# Patient Record
Sex: FEMALE | Race: WHITE
[De-identification: names, ages, dates, MRNs, and addresses within clinical notes are randomized per-mention and may not be internally consistent; named-entity substitution may affect disease eponyms.]

---

## 2020-02-09 ENCOUNTER — HOSPITAL ENCOUNTER (EMERGENCY)
Dept: HOSPITAL 75 - ER FS | Age: 30
Discharge: HOME | End: 2020-02-09
Payer: COMMERCIAL

## 2020-02-09 VITALS — WEIGHT: 253.97 LBS | BODY MASS INDEX: 45 KG/M2 | HEIGHT: 62.99 IN

## 2020-02-09 VITALS — SYSTOLIC BLOOD PRESSURE: 132 MMHG | DIASTOLIC BLOOD PRESSURE: 91 MMHG

## 2020-02-09 DIAGNOSIS — R10.84: ICD-10-CM

## 2020-02-09 DIAGNOSIS — Z88.5: ICD-10-CM

## 2020-02-09 DIAGNOSIS — N83.202: Primary | ICD-10-CM

## 2020-02-09 DIAGNOSIS — F17.200: ICD-10-CM

## 2020-02-09 DIAGNOSIS — Z88.8: ICD-10-CM

## 2020-02-09 LAB
ALBUMIN SERPL-MCNC: 4.3 GM/DL (ref 3.2–4.5)
ALP SERPL-CCNC: 70 U/L (ref 40–136)
ALT SERPL-CCNC: 25 U/L (ref 0–55)
APTT PPP: YELLOW S
BACTERIA #/AREA URNS HPF: (no result) /HPF
BASOPHILS # BLD AUTO: 0.1 10^3/UL (ref 0–0.1)
BASOPHILS NFR BLD AUTO: 1 % (ref 0–10)
BILIRUB SERPL-MCNC: < 0.2 MG/DL (ref 0.1–1)
BILIRUB UR QL STRIP: NEGATIVE
BUN/CREAT SERPL: 21
CALCIUM SERPL-MCNC: 9.4 MG/DL (ref 8.5–10.1)
CHLORIDE SERPL-SCNC: 101 MMOL/L (ref 98–107)
CO2 SERPL-SCNC: 25 MMOL/L (ref 21–32)
CREAT SERPL-MCNC: 0.66 MG/DL (ref 0.6–1.3)
EOSINOPHIL # BLD AUTO: 0.3 10^3/UL (ref 0–0.3)
EOSINOPHIL NFR BLD AUTO: 3 % (ref 0–10)
ERYTHROCYTE [DISTWIDTH] IN BLOOD BY AUTOMATED COUNT: 15.3 % (ref 10–14.5)
FIBRINOGEN PPP-MCNC: (no result) MG/DL
GFR SERPLBLD BASED ON 1.73 SQ M-ARVRAT: > 60 ML/MIN
GLUCOSE SERPL-MCNC: 102 MG/DL (ref 70–105)
GLUCOSE UR STRIP-MCNC: NEGATIVE MG/DL
HCT VFR BLD CALC: 40 % (ref 35–52)
HGB BLD-MCNC: 13.1 G/DL (ref 11.5–16)
KETONES UR QL STRIP: NEGATIVE
LEUKOCYTE ESTERASE UR QL STRIP: NEGATIVE
LIPASE SERPL-CCNC: 31 U/L (ref 8–78)
LYMPHOCYTES # BLD AUTO: 4.1 X 10^3 (ref 1–4)
LYMPHOCYTES NFR BLD AUTO: 42 % (ref 12–44)
MANUAL DIFFERENTIAL PERFORMED BLD QL: NO
MCH RBC QN AUTO: 25 PG (ref 25–34)
MCHC RBC AUTO-ENTMCNC: 33 G/DL (ref 32–36)
MCV RBC AUTO: 77 FL (ref 80–99)
MONOCYTES # BLD AUTO: 0.7 X 10^3 (ref 0–1)
MONOCYTES NFR BLD AUTO: 7 % (ref 0–12)
NEUTROPHILS # BLD AUTO: 4.6 X 10^3 (ref 1.8–7.8)
NEUTROPHILS NFR BLD AUTO: 47 % (ref 42–75)
NITRITE UR QL STRIP: NEGATIVE
PH UR STRIP: 5.5 [PH] (ref 5–9)
PLATELET # BLD: 328 10^3/UL (ref 130–400)
PMV BLD AUTO: 9.1 FL (ref 7.4–10.4)
POTASSIUM SERPL-SCNC: 4.4 MMOL/L (ref 3.6–5)
PROT SERPL-MCNC: 7.2 GM/DL (ref 6.4–8.2)
PROT UR QL STRIP: NEGATIVE
SODIUM SERPL-SCNC: 139 MMOL/L (ref 135–145)
SP GR UR STRIP: 1.02 (ref 1.02–1.02)
SQUAMOUS #/AREA URNS HPF: (no result) /HPF
WBC # BLD AUTO: 9.7 10^3/UL (ref 4.3–11)

## 2020-02-09 PROCEDURE — 81000 URINALYSIS NONAUTO W/SCOPE: CPT

## 2020-02-09 PROCEDURE — 85025 COMPLETE CBC W/AUTO DIFF WBC: CPT

## 2020-02-09 PROCEDURE — 96372 THER/PROPH/DIAG INJ SC/IM: CPT

## 2020-02-09 PROCEDURE — 83690 ASSAY OF LIPASE: CPT

## 2020-02-09 PROCEDURE — 80053 COMPREHEN METABOLIC PANEL: CPT

## 2020-02-09 PROCEDURE — 74176 CT ABD & PELVIS W/O CONTRAST: CPT

## 2020-02-09 PROCEDURE — 87088 URINE BACTERIA CULTURE: CPT

## 2020-02-09 PROCEDURE — 36415 COLL VENOUS BLD VENIPUNCTURE: CPT

## 2020-02-09 PROCEDURE — 84703 CHORIONIC GONADOTROPIN ASSAY: CPT

## 2020-02-09 NOTE — ED ABDOMINAL PAIN
General


Chief Complaint:  Abdominal/GI Problems


Stated Complaint:  LEFT UPPER QUADRANTPAIN


Nursing Triage Note:  


pt in police custody with Flor lewis, co left upper quad abd pain since this 


am, has some frequency and burning with urination


Sepsis Screen:  No Definite Risk


Source of Information:  Patient





History of Present Illness


Date Seen by Provider:  2020


Time Seen by Provider:  21:18


Initial Comments


29-year-old female presenting from [facility] with complaints of upper 

abdominal pain. She has had nausea but no vomiting. She hasn't been wanting to 

eat as much today because of her nausea and abdominal pain. She states that she 

has had some spotting for the last 3 weeks. She has sharp abdominal pain going 

into her back. It radiates across her abdomen. It is worse when she tries to eat

or drink something. She denies having pain like this before. She has had no 

fever or chills. She recently was on Bactrim for a rash.





Allergies and Home Medications


Allergies


Coded Allergies:  


     lithium (Verified  Allergy, Unknown, 20)


     meperidine (Verified  Allergy, Unknown, 20)





Home Medications


Dicyclomine HCl 20 Mg Tablet, 20 MG PO QID PRN for abdominal pain/cramping


   Prescribed by: TROY HARMAN on 20


Ondansetron 4 Mg Tab.rapdis, 4 MG PO Q6H PRN for NAUSEA/VOMITING


   Prescribed by: TROY HAWLEYRT on 20





Patient Home Medication List


Home Medication List Reviewed:  Yes





Review of Systems


Review of Systems


Constitutional:  No chills, No fever; malaise


EENTM:  No Symptoms Reported


Respiratory:  No Symptoms Reported


Cardiovascular:  No Symptoms Reported


Gastrointestinal:  See HPI


Genitourinary:  Denies Burning, Denies Discharge, Denies Drainage, Denies Flank 

Pain


Musculoskeletal:  no symptoms reported


Skin:  no symptoms reported


Psychiatric/Neurological:  No Symptoms Reported


Endocrine:  No Symptoms Reported





Past Medical-Social-Family Hx


Past Med/Social Hx:  Reviewed Nursing Past Med/Soc Hx


Patient Social History


Alcohol Use:  Denies Use


Recreational Drug Use:  No


Smoking Status:  Current Everyday Smoker


2nd Hand Smoke Exposure:  No


Recent Foreign Travel:  No


Contact w/Someone Who Travel:  No


Recent Infectious Disease Expo:  No


Recent Hopitalizations:  No


Physical Abuse:  No


Sexual Abuse:  No


Mistreated:  No


Fear:  No





Seasonal Allergies


Seasonal Allergies:  No





Past Medical History


Surgeries:  Yes


 Section


Respiratory:  No


Cardiac:  No


Neurological:  No


Genitourinary:  No


Gastrointestinal:  No


Musculoskeletal:  No


Endocrine:  No


HEENT:  No


Cancer:  No


Psychosocial:  No


Integumentary:  No


Blood Disorders:  No





Physical Exam


Vital Signs





Vital Signs - First Documented








 20





 20:58


 


Temp 36.8


 


Pulse 112


 


Resp 16


 


B/P (MAP) 158/88 (111)


 


Pulse Ox 98


 


O2 Delivery Room Air





Capillary Refill : Less Than 3 Seconds


Height/Weight/BMI


Height: '"


Weight: lbs. oz. kg; 45.00 BMI


Method:


General Appearance:  WD/WN, mild distress, obese


HEENT:  PERRL/EOMI, pharynx normal


Neck:  non-tender, full range of motion, supple, normal inspection


Respiratory:  chest non-tender, lungs clear, normal breath sounds


Cardiovascular:  normal peripheral pulses, regular rate, rhythm


Gastrointestinal:  normal bowel sounds, soft, no pulsatile mass; No distended, 

No guarding, No rebound; tenderness (diffuse mild tenderness with palpation), 

other (obese abdomen)


Rectal:  deferred


Extremities:  normal range of motion, non-tender, normal capillary refill


Back:  no vertebral tenderness, CVA tenderness (R), CVA tenderness (L)


Neurologic/Psychiatric:  CNs II-XII nml as tested, alert, oriented x 3


Skin:  normal color, warm/dry





Progress/Results/Core Measures


Results/Orders


Lab Results





Laboratory Tests








Test


 20


20:55 20


22:20 Range/Units


 


 


Urine Color YELLOW    


 


Urine Clarity


 SLIGHTLY


CLOUDY 


  





 


Urine pH 5.5   5-9  


 


Urine Specific Gravity 1.020   1.016-1.022  


 


Urine Protein NEGATIVE   NEGATIVE  


 


Urine Glucose (UA) NEGATIVE   NEGATIVE  


 


Urine Ketones NEGATIVE   NEGATIVE  


 


Urine Nitrite NEGATIVE   NEGATIVE  


 


Urine Bilirubin NEGATIVE   NEGATIVE  


 


Urine Urobilinogen 0.2   < = 1.0  MG/DL


 


Urine Leukocyte Esterase NEGATIVE   NEGATIVE  


 


Urine RBC (Auto) 3+ H  NEGATIVE  


 


Urine RBC 10-25 H   /HPF


 


Urine WBC 10-25 H   /HPF


 


Urine Squamous Epithelial


Cells 25-50 H


 


  /HPF





 


Urine Crystals NONE    /LPF


 


Urine Bacteria MODERATE H   /HPF


 


Urine Casts NONE    /LPF


 


Urine Mucus NEGATIVE    /LPF


 


Urine Culture Indicated YES    


 


Urine Pregnancy Test NEGATIVE   NEGATIVE  


 


White Blood Count


 


 9.7 


 4.3-11.0


10^3/uL


 


Red Blood Count


 


 5.18 


 4.35-5.85


10^6/uL


 


Hemoglobin  13.1  11.5-16.0  G/DL


 


Hematocrit  40  35-52  %


 


Mean Corpuscular Volume  77 L 80-99  FL


 


Mean Corpuscular Hemoglobin  25  25-34  PG


 


Mean Corpuscular Hemoglobin


Concent 


 33 


 32-36  G/DL





 


Red Cell Distribution Width  15.3 H 10.0-14.5  %


 


Platelet Count


 


 328 


 130-400


10^3/uL


 


Mean Platelet Volume  9.1  7.4-10.4  FL


 


Neutrophils (%) (Auto)  47  42-75  %


 


Lymphocytes (%) (Auto)  42  12-44  %


 


Monocytes (%) (Auto)  7  0-12  %


 


Eosinophils (%) (Auto)  3  0-10  %


 


Basophils (%) (Auto)  1  0-10  %


 


Neutrophils # (Auto)  4.6  1.8-7.8  X 10^3


 


Lymphocytes # (Auto)  4.1 H 1.0-4.0  X 10^3


 


Monocytes # (Auto)  0.7  0.0-1.0  X 10^3


 


Eosinophils # (Auto)


 


 0.3 


 0.0-0.3


10^3/uL


 


Basophils # (Auto)


 


 0.1 


 0.0-0.1


10^3/uL


 


Sodium Level  139  135-145  MMOL/L


 


Potassium Level  4.4  3.6-5.0  MMOL/L


 


Chloride Level  101    MMOL/L


 


Carbon Dioxide Level  25  21-32  MMOL/L


 


Anion Gap  13  5-14  MMOL/L


 


Blood Urea Nitrogen  14  7-18  MG/DL


 


Creatinine


 


 0.66 


 0.60-1.30


MG/DL


 


Estimat Glomerular Filtration


Rate 


 > 60 


  





 


BUN/Creatinine Ratio  21   


 


Glucose Level  102    MG/DL


 


Calcium Level  9.4  8.5-10.1  MG/DL


 


Corrected Calcium  9.2  8.5-10.1  MG/DL


 


Total Bilirubin  < 0.2  0.1-1.0  MG/DL


 


Aspartate Amino Transf


(AST/SGOT) 


 18 


 5-34  U/L





 


Alanine Aminotransferase


(ALT/SGPT) 


 25 


 0-55  U/L





 


Alkaline Phosphatase  70    U/L


 


Total Protein  7.2  6.4-8.2  GM/DL


 


Albumin  4.3  3.2-4.5  GM/DL


 


Lipase  31  8-78  U/L








My Orders





Orders - TROY HARMAN MD


Ua Culture If Indicated (20 20:38)


Hcg,Qualitative Urine (20 20:38)


Urine Culture (20 20:55)


Ketorolac Injection (Toradol Injection) (20 21:45)


Ct Abdomen/Pelvis Wo (20 21:43)


Ondansetron  Oral Dissolve Tab (Zofran (20 22:05)


Cbc With Automated Diff (20 22:05)


Comprehensive Metabolic Panel (20 22:05)


Lipase (20 22:05)


Dicyclomine Injection (Bentyl Injection) (20 23:19)





Medications Given in ED





Current Medications








 Medications  Dose


 Ordered  Sig/Jaime


 Route  Start Time


 Stop Time Status Last Admin


Dose Admin


 


 Ketorolac


 Tromethamine  60 mg  ONCE  ONCE


 IM  20 21:45


 20 21:46 DC 20 22:19


60 MG








Vital Signs/I&O











 20





 20:58 23:35


 


Temp 36.8 


 


Pulse 112 72


 


Resp 16 18


 


B/P (MAP) 158/88 (111) 132/91


 


Pulse Ox 98 99


 


O2 Delivery Room Air Room Air














Blood Pressure Mean:                    111











Progress


Progress Note #1:  


Progress Note


Obtain urinalysis and pregnancy test. The pregnancy test was negative and the 

urinalysis showed some blood but no definite infection. Will give Toradol for 

pain and obtain basic lab been CT scan to evaluate further for her pain and 

symptoms.


Progress Note #2:  


   Time:  21:43


Progress Note


Obtain labs and CT scan to evaluate her abdominal pain and nausea further.


Progress Note #3:  


   Time:  23:01


Progress Note


Labs did not show any acute significant abnormality. Her symptoms were improved 

with treatment. The CT scan shows no acute pathology but she does have a cystic 

mass around the left ovary. Encouraged to follow-up with Gynecology regarding 

this. Will treat symptomatically for possible gastroenteritis. Given Bentyl 

prior to discharge and prescribed Bentyl (dicyclomine) as well as Zofran for her

symptoms.





Diagnostic Imaging





   Diagonstic Imaging:  CT


   Plain Films/CT/US/NM/MRI:  abdomen, pelvis


Comments


Impression:





1. No acute pathology seen





2. Follow-up recommended to evaluate cystic mass from the ovary with 

differential includes benign ovarian neoplasm, endometrioma. 





Radiologist Perez Eaton M.D. Read at [id number] and faxed at [phone]


   [facility]  [facility]





Departure


Impression





   Primary Impression:  


   Nausea alone


   Additional Impressions:  


   Diffuse abdominal pain


   Left ovarian cyst


Disposition:   HOME, SELF-CARE


Condition:  Stable





Departure-Patient Inst.


Decision time for Depature:  23:21


Referrals:  


NO,LOCAL PHYSICIAN (PCP)


Primary Care Physician








[facility] [place] [facility] [place] Instructions:  Acute Abdomen (Belly Pain), Adult (DC), Full Liquid Diet,

Nausea and Vomiting, Adult (DC), Ovarian Cyst (DC)





Add. Discharge Instructions:  


Follow  a liquid diet for the next 24 hours.





Use the medicine for cramping/abdominal pain and nausea.





Check with provider about abdominal pain/nausea





Follow up with Gynecology about the ovarian cyst on the left. 





All discharge instructions reviewed with patient and/or family. Voiced 

understanding.


Scripts


Dicyclomine HCl (Dicyclomine HCl) 20 Mg Tablet


20 MG PO QID PRN for abdominal pain/cramping for 5 Days, #20 TAB 0 Refills


   Prov: TROY HARMAN MD         20 


Ondansetron (Ondansetron Odt) 4 Mg Tab.rapdis


4 MG PO Q6H PRN for NAUSEA/VOMITING for 2 Days, #8 TAB 0 Refills


   Prov: TROY HARMAN MD         20





Images


Torso/Trunk











1 - Mild, Tenderness (pain in upper abdomen that radiates to back)














TROY HARMAN MD                2020 21:18

## 2020-02-10 NOTE — DIAGNOSTIC IMAGING REPORT
PROCEDURE: CT abdomen and pelvis without contrast.



TECHNIQUE: Multiple contiguous axial images were obtained through

the abdomen and pelvis without the use of intravenous contrast.

Auto Exposure Controls were utilized during the CT exam to meet

ALARA standards for radiation dose reduction. 



DATE: February 9, 2020.



COMPARISON: None. 



INDICATION: 29-year-old female, left upper quadrant abdominal

pain.



FINDINGS: 



There are limitations for evaluation of the abdominal organs,

neoplastic processes, abscess, and limited evaluation of the

vasculature relating to the lack of intravenous contrast. 



The visualized portions of the lung bases are clear. The heart is

not enlarged. There is no pericardial effusion.



The liver is diffusely low in attenuation consistent with diffuse

fatty infiltration of the liver. The outer liver contours are not

nodular. The gallbladder is unremarkable. There is no biliary

ductal dilation. The main pancreatic duct is not abnormally

dilated. Limited noncontrast evaluation of the pancreatic

parenchyma is unremarkable. The spleen is not enlarged. There is

an accessory splenule on axial image 31. The adrenal glands are

unremarkable.



Unremarkable appearance of the renal parenchyma. The urinary

collecting systems are not distended. There is no identified

renal or ureteral stone. There is a cystic mass in the pelvis

measuring approximately 8.4 x 7.9 cm in axial dimension as

measured on axial image 118 which may be a cystic left ovarian

lesion versus paraovarian in location. There is mass effect on

the urinary bladder. The urinary bladder is otherwise

unremarkable in appearance.



 The intestinal tract is not distended. There is no evidence to

suggest acute appendicitis. There is no free intraperitoneal air.

There is no drainable fluid collection. There is no free pelvic

fluid.



There is no identified abnormally enlarged lymph node in the

abdomen or pelvis which meets CT size criteria for adenopathy.



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 There is no

identified acute bony abnormality.



IMPRESSION: 

CT ABDOMEN AND PELVIS. 

1. Cystic mass in the pelvis measuring approximately 8.4 x 7.9 cm

in size which may be ovarian in etiology versus paraovarian. This

is not well characterized on CT. This potentially could relate to

a large ovarian cyst or paraovarian cyst although neoplastic

etiology is also in the differential diagnosis. Pelvic ultrasound

is recommended for further evaluation.

2. No otherwise identified potential acute abnormality in the

abdomen or pelvis.

3. Diffuse fatty infiltration of the liver. 



Dictated by: 



  Dictated on workstation # [id number]

## 2020-02-17 NOTE — XMS REPORT
Continuity of Care Document

                             Created on: 2020



She Olmstead

External Reference #: [id number]

: 1990

Sex: Female



Demographics





                          Address                   [address] [place] [address]  [address]

 

                          Home Phone                [phone] x

 

                          Preferred Language        Unknown

 

                          Marital Status            Unknown

 

                          Judaism Affiliation     Unknown

 

                          Race                      Unknown

 

                          Ethnic Group              Unknown





Author





                          Organization              Unknown

 

                          Address                   Unknown

 

                          Phone                     Unavailable



              



Allergies

      



             Active           Description           Code           Type         

  Severity   

                Reaction           Onset           Reported/Identified          

 

Relationship to Patient                 Clinical Status        

 

             Yes           lithium           [id number]           Drug Allergy  

         

Unknown           N/A                        2020                         

  

     

 

             Yes           meperidine           [id number]           Drug Allerg

y           

Unknown           N/A                        2020                         

  

     



                    



Medications

      



There is no data.                  



Problems

      



There is no data.                  



Procedures

      



There is no data.                  



Results

      



                    Test                Result              Range        

 

                                        Urine beta human chorionic gonadotropin 

(hCG) measurement - 20 20:55      

  

 

                          Urine beta human chorionic gonadotropin (hCG) measurem

ent           NEGATIVE    

                                        NEGATIVE        

 

                                        Complete urinalysis with reflex to cultu

re - 20 20:55         

 

                    Urine color determination           YELLOW              NRG 

       

 

                    Urine clarity determination           SLIGHTLY CLOUDY       

     NRG        

 

                    Urine pH measurement by test strip           5.5            

     5-9        

 

                    Specific gravity of urine by test strip           1.020     

          1.016-1.022  

     

 

                          Urine protein assay by test strip, semi-quantitative  

         NEGATIVE         

                                        NEGATIVE        

 

                    Urine glucose detection by automated test strip           NE

GATIVE            

NEGATIVE        

 

                          Erythrocytes detection in urine sediment by light micr

oscopy           3+       

                                        NEGATIVE        

 

                    Urine ketones detection by automated test strip           NE

GATIVE            

NEGATIVE        

 

                    Urine nitrite detection by test strip           NEGATIVE    

        NEGATIVE    

   

 

                    Urine total bilirubin detection by test strip           NEGA

TIVE            

NEGATIVE        

 

                          Urine urobilinogen measurement by automated test strip

 (mass/volume)           

0.2 mg/dL                               < = 1.0        

 

                    Urine leukocyte esterase detection by dipstick           NEG

ATIVE            

NEGATIVE        

 

                                        Automated urine sediment erythrocyte cou

nt by microscopy (number/high power 

field)                     [HPF]                    NRG        

 

                                        Automated urine sediment leukocyte count

 by microscopy (number/high power field)

                           [HPF]                    NRG        

 

                          Bacteria detection in urine sediment by light microsco

py           MODERATE     

                                        NRG        

 

                                        Squamous epithelial cells detection in u

rine sediment by light microscopy       

                          25-50                     NRG        

 

                          Crystals detection in urine sediment by light microsco

py           NONE         

                                        NRG        

 

                    Casts detection in urine sediment by light microscopy       

    NONE                

NRG        

 

                          Mucus detection in urine sediment by light microscopy 

          NEGATIVE        

                                        NRG        

 

                    Complete urinalysis with reflex to culture           YES    

             NRG        

 

                                        Bacterial urine culture - 20 20:55

         

 

                    Bacterial urine culture           NG                  NRG   

     

 

                                        Complete blood count (CBC) with automate

d white blood cell (WBC) differential - 

20 22:20         

 

                          Blood leukocytes automated count (number/volume)      

     9.7 10*3/uL          

                                        4.3-11.0        

 

                          Blood erythrocytes automated count (number/volume)    

       5.18 10*6/uL       

                                        4.35-5.85        

 

                    Venous blood hemoglobin measurement (mass/volume)           

13.1 g/dL           

11.5-16.0        

 

                    Blood hematocrit (volume fraction)           40 %           

     35-52        

 

                    Automated erythrocyte mean corpuscular volume           77 [

foz_us]           

80-99        

 

                                        Automated erythrocyte mean corpuscular h

emoglobin (mass per erythrocyte)        

                          25 pg                     25-34        

 

                                        Automated erythrocyte mean corpuscular h

emoglobin concentration measurement 

(mass/volume)             33 g/dL                   32-36        

 

                    Automated erythrocyte distribution width ratio           15.

3 %              10.0-

14.5        

 

                    Automated blood platelet count (count/volume)           328 

10*3/uL           

130-400        

 

                          Automated blood platelet mean volume measurement      

     9.1 [foz_us]         

                                        7.4-10.4        

 

                    Automated blood neutrophils/100 leukocytes           47 %   

             42-75       

 

 

                    Automated blood lymphocytes/100 leukocytes           42 %   

             12-44       

 

 

                    Blood monocytes/100 leukocytes           7 %                

 0-12        

 

                    Automated blood eosinophils/100 leukocytes           3 %    

             0-10        

 

                    Automated blood basophils/100 leukocytes           1 %      

           0-10        

 

                    Blood neutrophils automated count (number/volume)           

4.6 10*3            

1.8-7.8        

 

                    Blood lymphocytes automated count (number/volume)           

4.1 10*3            

1.0-4.0        

 

                    Blood monocytes automated count (number/volume)           0.

7 10*3            

0.0-1.0        

 

                    Automated eosinophil count           0.3 10*3/uL           0

.0-0.3        

 

                    Automated blood basophil count (count/volume)           0.1 

10*3/uL           

0.0-0.1        

 

                                        Comprehensive metabolic panel - 20

 22:20         

 

                          Serum or plasma sodium measurement (moles/volume)     

      139 mmol/L          

                                        135-145        

 

                          Serum or plasma potassium measurement (moles/volume)  

         4.4 mmol/L       

                                        3.6-5.0        

 

                          Serum or plasma chloride measurement (moles/volume)   

        101 mmol/L        

                                                

 

                    Carbon dioxide           25 mmol/L           21-32        

 

                          Serum or plasma anion gap determination (moles/volume)

           13 mmol/L      

                                        5-14        

 

                          Serum or plasma urea nitrogen measurement (mass/volume

)           14 mg/dL      

                                        7-18        

 

                          Serum or plasma creatinine measurement (mass/volume)  

         0.66 mg/dL       

                                        0.60-1.30        

 

                    Serum or plasma urea nitrogen/creatinine mass ratio         

  21                  NRG 

       

 

                                        Serum or plasma creatinine measurement w

ith calculation of estimated glomerular 

filtration rate           >                         NRG        

 

                    Serum or plasma glucose measurement (mass/volume)           

102 mg/dL           

        

 

                    Serum or plasma calcium measurement (mass/volume)           

9.4 mg/dL           

8.5-10.1        

 

                          Serum or plasma total bilirubin measurement (mass/volu

me)           < mg/dL     

                                        0.1-1.0        

 

                                        Serum or plasma alkaline phosphatase konrad

surement (enzymatic activity/volume)    

                          70 U/L                            

 

                                        Serum or plasma aspartate aminotransfera

se measurement (enzymatic 

activity/volume)           18 U/L                    5-34        

 

                                        Serum or plasma alanine aminotransferase

 measurement (enzymatic activity/volume)

                          25 U/L                    0-55        

 

                    Serum or plasma protein measurement (mass/volume)           

7.2 g/dL            

6.4-8.2        

 

                    Serum or plasma albumin measurement (mass/volume)           

4.3 g/dL            

3.2-4.5        

 

                    CALCIUM CORRECTED           9.2 mg/dL           8.5-10.1    

    

 

                                        Lipase - 20 22:20         

 

                    Lipase              31 U/L              8-78        



                          



Encounters

      



                ACCT No.           Visit Date/Time           Discharge          

 Status         

             Pt. Type           Provider           Facility           Loc./Unit 

          

Complaint        

 

                [id number]           2019 16:00:13           2019 23:59:

59           CLS

             Outpatient                                                         

  

 

                    [id number]           2020 20:38:00           

020 23:35:00        

                DIS             Emergency           GHAZAL GAINES, TR[facility]           ER [facility]                     LEFT UPPER [facility]

## 2020-02-17 NOTE — XMS REPORT
Electronic Copy

                             Created on: 2017



DEONTE MIDDLETON

External Reference #: Avatar : [id number]

: 1990

Sex: Female



Demographics





                          Address                   [address] [place]  [address]

 

                          Home Phone                [phone]

 

                          Preferred Language        Unknown

 

                          Marital Status            Unknown

 

                          Moravian Affiliation     Unknown

 

                          Race                      Unknown

 

                          Ethnic Group              Unknown





Author





                          Author                    DEONTE SWANSON

 

                          [facility]

 

                          Address                   [address], [facility] [address]

[place]  [address]



 

                          Phone                     [phone]







Care Team Providers





                    Care Team Member Name Role                Phone

 

                    SAHILBertoJEN  Unavailable         [phone]

 

                    KIKICORY     Unavailable         [phone]



                      



Problems

           



             Problem          SNOMED          Onset Date          Resolved Date 

         

Status        

 

                          [id number]                                 Ac

tive        

 

             Drug therapy finding          [id number]                  

               

Active        

 

                    Moderate depressed bipolar I disorder          [id number]     

            

                                                    Active        



                                                 



Allergies, Adverse Reactions

     No Known Allergy                                  



Care Plan

           

      



                          Goal                      Instructions        

 

                                         Client will be functioning more indepen

dently with supports and have a life 

worth living.                           CRISIS SERVICE BUNDLE   +    Crisis

 Intervention 

Attd Care    + [id number] Crisis Intervention BA level            

 

                                         Client will be functioning more indepen

dently with supports and have a life 

worth living.                           ADULT THERAPY SERVICE BUNDLE - Provide t

he following 

services 1-3 times each week:  + 53376 Psychotherapy, 16-37 Minutes    + 41869 
Psychotherapy, 38-52 Minutes    + 99279 Psychotherapy, 53+ Minutes    + 13907 
Psychotherapy for Crisis 31-74 Minutes  + 00135 Each Add'l 30 Min Crisis 
Psychotherapy    + 29716 Group Therapy    + 18797 Case Conf w/Clt NN-Physician  
 + 76030 Case Conf w/o Clt + Fam w/MD    + 30604 Case Conf w/o Clt w/MD    + 
[id number] Psychosocial Adult Group    +  TCM - Targeted Case Management      
   

 

                                         Client will be functioning more indepen

dently with supports and have a life 

worth living.                           CRISIS SERVICE BUNDLE   +    Crisis

 Intervention 

Attd Care    + [id number] Crisis Intervention BA level    + [id number] Crisis 
Intervention QMHP          

 

                                         Improve and maintain functioning throug

h medical psychiatric services.         

                                        Initial Psychiatric Evaluation, Ongoing 

medication monitoring and management, 

Case Conference with multidisciplinary members of the [facility] team as indicated, 
and/or Collaboration and coordination with outside medical providers as 
indicated by providing the following services:  68149 interactive complexity  
24963 psychiatric diagnostic eval w/ meds  48420 30 min psychotherapy add-on  
14689 45 min psychotherapy add on  25604 60 min psychotherapy add-on  90344 med 
injection  79474 New Patient E&M (level 1)  78036 New Patient E&M (level 2)  
04379 New Patient E&M (level 3)  90542 New patient E&amp;M (level 4)  63468 New 
Patient E&M (level 5)  27462 Established Patient E&M (level 1)  51234 
Established Patient E&M (level 2)  51238 Established Patient E&M (level 3)  
65926 Established Patient E&M (level 4)  10232 Established Patient E&M (level 5)
 9935x prolonged service code   98636 case conference w/o clt & fam w/ MD  69624
case conference w/o clt w/ MD     Peer Support Delfina        



      

      



                Date            Name            Code Type          Code        

 

                                          Valproic Acid, Serum (Valpro

ic Acid Level) (Depakote Level) 

                                                    65460        

 

                              Hepatic Function Panel (7) (LFT) (Liver)

                    

                                        87114        

 

                              Complete Blood Count (CBC) With Differen

tial                

                                        42844        



      

      

                                                 



Medications

           



                Medication          Code            Dose,Form,Route,Freq        

  Start Date      

                                        End Date        

 

             Perigard IUD                                                       

   

 

                    Escitalopram - 10 MG ORAL Tablet          033470            

  Take one (1) tablet by

mouth every morning                  

 

                    busPIRone - 10 MG ORAL Tablet          469669              T

carlo one (1) tablet by 

mouth three times a day                  

 

                          Divalproex Sodium - 500 MG ORAL Tablet, Extended Relea

se          [id number]       

                    Take two (2) tablets by mouth at bedtime                  



                                                      



Lab Results

     NA                                  



Encounters

           



             Date          Time          Service          Code          Provider

        

 

                       09:30:00 am                                    

CORY SWANSON   

    



                                       



Family History

                                  



Functional Status

     NA                                  



Immunizations

     NA                                  



Vital Signs

           



           Date          Time          BP          Pulse          Temp          

Height     

                          Weight                    BMI        

 

                       12:23:00 pm          136 over 90          90 bp

m            

                    64.5 in             252 lbs             42.6 kg/m^2        



                                       



Social History

           



                    Date                Smoking Status          SNOMED Code     

   

 

                              Current Every Day Smoker          846628

002        



                                       



Hospital Discharge Instructions

     NA                             



Hospital Discharge medications

           



                Date            Medication          Dose, Form, Route, Freq     

     Code        

 

                                Perigard IUD                                   



                                       



Instructions

                  *         Not Applicable       

                                             



Procedures

     NA                                  



Purpose

           Electronic Copy

## 2022-05-29 ENCOUNTER — HOSPITAL ENCOUNTER (EMERGENCY)
Dept: HOSPITAL 63 - ER | Age: 32
Discharge: HOME | End: 2022-05-29
Payer: MEDICAID

## 2022-05-29 VITALS — WEIGHT: 263.01 LBS | BODY MASS INDEX: 48.4 KG/M2 | HEIGHT: 62 IN

## 2022-05-29 VITALS — SYSTOLIC BLOOD PRESSURE: 132 MMHG | DIASTOLIC BLOOD PRESSURE: 78 MMHG

## 2022-05-29 DIAGNOSIS — K43.9: Primary | ICD-10-CM

## 2022-05-29 DIAGNOSIS — Z91.040: ICD-10-CM

## 2022-05-29 LAB
ALBUMIN SERPL-MCNC: 3.7 G/DL (ref 3.4–5)
ALBUMIN/GLOB SERPL: 1 {RATIO} (ref 1–1.7)
ALP SERPL-CCNC: 82 U/L (ref 46–116)
ALT SERPL-CCNC: 33 U/L (ref 14–59)
ANION GAP SERPL CALC-SCNC: 10 MMOL/L (ref 6–14)
ANISOCYTOSIS BLD QL SMEAR: SLIGHT
APTT PPP: YELLOW S
AST SERPL-CCNC: 16 U/L (ref 15–37)
BACTERIA #/AREA URNS HPF: 0 /HPF
BASOPHILS # BLD AUTO: 0.1 X10^3/UL (ref 0–0.2)
BASOPHILS NFR BLD: 1 % (ref 0–3)
BILIRUB SERPL-MCNC: 0.1 MG/DL (ref 0.2–1)
BUN/CREAT SERPL: 18 (ref 6–20)
CA-I SERPL ISE-MCNC: 14 MG/DL (ref 7–20)
CALCIUM SERPL-MCNC: 9.1 MG/DL (ref 8.5–10.1)
CHLORIDE SERPL-SCNC: 101 MMOL/L (ref 98–107)
CO2 SERPL-SCNC: 25 MMOL/L (ref 21–32)
CREAT SERPL-MCNC: 0.8 MG/DL (ref 0.6–1)
EOSINOPHIL NFR BLD: 0.2 X10^3/UL (ref 0–0.7)
EOSINOPHIL NFR BLD: 3 % (ref 0–3)
ERYTHROCYTE [DISTWIDTH] IN BLOOD BY AUTOMATED COUNT: 17.7 % (ref 11.5–14.5)
FIBRINOGEN PPP-MCNC: CLEAR MG/DL
GFR SERPLBLD BASED ON 1.73 SQ M-ARVRAT: 83.1 ML/MIN
GLOBULIN SER-MCNC: 3.6 G/DL (ref 2.2–3.8)
GLUCOSE SERPL-MCNC: 105 MG/DL (ref 70–99)
GLUCOSE UR STRIP-MCNC: (no result) MG/DL
HCT VFR BLD CALC: 35.9 % (ref 36–47)
HGB BLD-MCNC: 11.2 G/DL (ref 12–15.5)
HYPOCHROMIA BLD QL SMEAR: SLIGHT
LIPASE: 103 U/L (ref 73–393)
LYMPHOCYTES # BLD: 3.2 X10^3/UL (ref 1–4.8)
LYMPHOCYTES NFR BLD AUTO: 37 % (ref 24–48)
MCH RBC QN AUTO: 21 PG (ref 25–35)
MCHC RBC AUTO-ENTMCNC: 31 G/DL (ref 31–37)
MCV RBC AUTO: 67 FL (ref 79–100)
MICROCYTES BLD QL SMEAR: (no result)
MONO #: 0.5 X10^3/UL (ref 0–1.1)
MONOCYTES NFR BLD: 6 % (ref 0–9)
NEUT #: 4.7 X10^3UL (ref 1.8–7.7)
NEUTROPHILS NFR BLD AUTO: 54 % (ref 31–73)
NITRITE UR QL STRIP: (no result)
PLATELET # BLD AUTO: 377 X10^3/UL (ref 140–400)
PLATELET # BLD EST: ADEQUATE 10*3/UL
POTASSIUM SERPL-SCNC: 4.2 MMOL/L (ref 3.5–5.1)
PROT SERPL-MCNC: 7.3 G/DL (ref 6.4–8.2)
RBC # BLD AUTO: 5.4 X10^6/UL (ref 3.5–5.4)
RBC #/AREA URNS HPF: 0 /HPF (ref 0–2)
SODIUM SERPL-SCNC: 136 MMOL/L (ref 136–145)
SP GR UR STRIP: >=1.03
SQUAMOUS #/AREA URNS LPF: (no result) /LPF
U PREG PATIENT: NEGATIVE
UROBILINOGEN UR-MCNC: 0.2 MG/DL
WBC # BLD AUTO: 8.8 X10^3/UL (ref 4–11)
WBC #/AREA URNS HPF: 0 /HPF (ref 0–4)

## 2022-05-29 PROCEDURE — 81001 URINALYSIS AUTO W/SCOPE: CPT

## 2022-05-29 PROCEDURE — 74176 CT ABD & PELVIS W/O CONTRAST: CPT

## 2022-05-29 PROCEDURE — 36415 COLL VENOUS BLD VENIPUNCTURE: CPT

## 2022-05-29 PROCEDURE — 99284 EMERGENCY DEPT VISIT MOD MDM: CPT

## 2022-05-29 PROCEDURE — 83690 ASSAY OF LIPASE: CPT

## 2022-05-29 PROCEDURE — 81025 URINE PREGNANCY TEST: CPT

## 2022-05-29 PROCEDURE — 96375 TX/PRO/DX INJ NEW DRUG ADDON: CPT

## 2022-05-29 PROCEDURE — 80053 COMPREHEN METABOLIC PANEL: CPT

## 2022-05-29 PROCEDURE — 96374 THER/PROPH/DIAG INJ IV PUSH: CPT

## 2022-05-29 PROCEDURE — 83605 ASSAY OF LACTIC ACID: CPT

## 2022-05-29 PROCEDURE — 96361 HYDRATE IV INFUSION ADD-ON: CPT

## 2022-05-29 PROCEDURE — 85025 COMPLETE CBC W/AUTO DIFF WBC: CPT

## 2022-05-29 NOTE — PHYS DOC
Adult General


Chief Complaint


Chief Complaint:  ABDOMINAL PAIN





[facility]


HPI





Patient is a 31year old female who presents with complaint of abdominal pain.  

Patient states that she has an abdominal hernia that has been present shortly 

after undergoing surgery for a left oophorectomy in 2021.  She states that

she has had discomfort in this area, however over the past 3 weeks she has 

noticed increasing pain and discomfort at her hernia site.  She has not followed

with a general surgeon at this time.  Denies associated shortness of breath, 

fever, bloody stools, or vomiting but does note nausea at this time.  He has 

been taking ibuprofen at home but notes no significant improvement.





Review of Systems


Review of Systems





Constitutional: Denies fever or chills []


Eyes: Denies change in visual acuity, redness, or eye pain []


HENT: Denies nasal congestion or sore throat []


Respiratory: Denies cough or shortness of breath []


Cardiovascular: No additional information not addressed in HPI []


GI: Abdominal pain, nausea, denies vomiting, bloody stools or diarrhea []


: Denies dysuria or hematuria []


Musculoskeletal: Denies back pain or joint pain []


Integument: Denies rash or skin lesions []


Neurologic: Denies headache, focal weakness or sensory changes []








All other systems were reviewed and found to be within normal limits, except as 

documented in this note.





Allergies


Allergies





Allergies








Coded Allergies Type Severity Reaction Last Updated Verified


 


  latex Allergy Unknown  22 Yes











Physical Exam


Physical Exam





Constitutional: Alert, obese, afebrile, appears in mild discomfort. []


HENT: Normocephalic, atraumatic, bilateral external ears normal, oropharynx 

moist, no oral exudates, nose normal. []


Eyes: PERRLA, EOMI, conjunctiva normal, no discharge. [] 


Neck: Normal range of motion, no tenderness, supple, no stridor. [] 


Cardiovascular:Heart rate regular rhythm, no murmur []


Lungs & Thorax:  Bilateral breath sounds clear to auscultation []


Abdomen: Bowel sounds normal, soft, periumbilical tenderness to palpation 

superior to umbilicus with palpable reducible hernia at the site, no masses, no 

pulsatile masses. [] 


Skin: Warm, dry, no erythema, no rash. [] 


Back: No tenderness, no CVA tenderness. [] 


Extremities: No tenderness, no cyanosis, no clubbing, ROM intact, no edema. [] 


Neurologic: Alert and oriented X 3, normal motor function, normal sensory 

function, no focal deficits noted. []





Current Patient Data


Vital Signs





                                   Vital Signs








  Date Time  Temp Pulse Resp B/P (MAP) Pulse Ox O2 Delivery O2 Flow Rate FiO2


 


22 16:00 98.8 130 20 153/95 (114) 98 Room Air  








Lab Results





Laboratory Tests








Test


 22


16:03 22


16:50


 


Bedside Urine HCG, Qualitative hcg negative  


 


White Blood Count  8.8 x10^3/uL 


 


Red Blood Count  5.40 x10^6/uL 


 


Hemoglobin  11.2 g/dL 


 


Hematocrit  35.9 % 


 


Mean Corpuscular Volume  67 fL 


 


Mean Corpuscular Hemoglobin  21 pg 


 


Mean Corpuscular Hemoglobin


Concent 


 31 g/dL 





 


Red Cell Distribution Width  17.7 % 


 


Platelet Count  377 x10^3/uL 


 


Neutrophils (%) (Auto)  54 % 


 


Lymphocytes (%) (Auto)  37 % 


 


Monocytes (%) (Auto)  6 % 


 


Eosinophils (%) (Auto)  3 % 


 


Basophils (%) (Auto)  1 % 


 


Neutrophils # (Auto)  4.7 x10^3uL 


 


Lymphocytes # (Auto)  3.2 x10^3/uL 


 


Monocytes # (Auto)  0.5 x10^3/uL 


 


Eosinophils # (Auto)  0.2 x10^3/uL 


 


Basophils # (Auto)  0.1 x10^3/uL 


 


Urine Collection Type  Clean catch 


 


Urine Color  Yellow 


 


Urine Clarity  Clear 


 


Urine pH  6.0 


 


Urine Specific Gravity  >=1.030 


 


Urine Protein  Neg 


 


Urine Glucose (UA)  Neg mg/dL 


 


Urine Ketones (Stick)  Neg mg/dL 


 


Urine Blood  Neg 


 


Urine Nitrite  Neg 


 


Urine Bilirubin  Neg 


 


Urine Urobilinogen Dipstick  0.2 mg/dL 


 


Urine Leukocyte Esterase  Neg 


 


Urine RBC  0 /HPF 


 


Urine WBC  0 /HPF 


 


Urine Squamous Epithelial


Cells 


 Many /LPF 





 


Urine Bacteria  0 /HPF 


 


Urine Pregnancy Test  Negative 


 


Sodium Level  136 mmol/L 


 


Potassium Level  4.2 mmol/L 


 


Chloride Level  101 mmol/L 


 


Carbon Dioxide Level  25 mmol/L 


 


Anion Gap  10 


 


Blood Urea Nitrogen  14 mg/dL 


 


Creatinine  0.8 mg/dL 


 


Estimated GFR


(Cockcroft-Gault) 


 83.1 





 


BUN/Creatinine Ratio  18 


 


Glucose Level  105 mg/dL 


 


Lactic Acid Level  1.5 mmol/L 


 


Calcium Level  9.1 mg/dL 


 


Total Bilirubin  0.1 mg/dL 


 


Aspartate Amino Transf


(AST/SGOT) 


 16 U/L 





 


Alanine Aminotransferase


(ALT/SGPT) 


 33 U/L 





 


Alkaline Phosphatase  82 U/L 


 


Total Protein  7.3 g/dL 


 


Albumin  3.7 g/dL 


 


Albumin/Globulin Ratio  1.0 


 


Lipase  103 U/L 








Current Medications








 Medications


  (Trade)  Dose


 Ordered  Sig/Charisma


 Route


 PRN Reason  Start Time


 Stop Time Status Last Admin


Dose Admin


 


 Sodium Chloride  1,000 ml @ 


 1,000 mls/hr  Q1H


 IV


   22 16:30


 22 17:29 DC 22 17:43





 


 Morphine Sulfate


  (Morphine 4mg


 Syringe)  4 mg  1X  ONCE


 IV


   22 17:30


 22 17:36 DC 22 17:44





 


 Ondansetron HCl


  (Zofran)  4 mg  1X  ONCE


 IVP


   22 17:30


 22 17:36 DC 22 17:44














EKG


EKG


Not performed []





Radiology/Procedures


Radiology/Procedures


[facility] [address], [place] [address]


                                 [phone]


                                        


                                 IMAGING REPORT





                                     Signed





PATIENT: MARITZA ENRIQUE DACCOUNT: [id number]     MRN#: [id number]


: 1990           LOCATION: ER              AGE: 31


SEX: F                    EXAM DT: 22         ACCESSION#: [id number]


STATUS: REG ER            ORD. PHYSICIAN: HOWARD GARZA MD


REASON: periumbilical abdominal pain, hx of ventral hernia


PROCEDURE: CT ABDOMEN PELVIS WO CONTRAST





CT ABDOMEN+PELVIS WO





History: Periumbilical abdominal pain, history of ventral hernia.





Comparison: None.





Technique: Noncontrast CT of the abdomen and pelvis. 





Findings:


The lung bases are clear. The heart is unremarkable.





Diffuse hypoattenuation the liver compatible with steatosis. The gallbladder is 

relatively decompressed. The pancreas, spleen, adrenal glands, and kidneys are 

unremarkable.





The stomach, small bowel, appendix and colon are within normal limits.





The bladder, uterus and adnexa are unremarkable. No intra-abdominal free air or 

free fluid. The unenhanced vasculature is unremarkable.





There is a left periumbilical ventral hernia containing peritoneal fat with 

broad neck measuring 3.7 x 3.0 cm. The hernia sac measures approximately 7.6 x 

6.6 x 4.4 cm. Tiny adjacent fat-containing umbilical hernia. Linear scarring in 

the infraumbilical midline abdominal wall likely relates to postsurgical 

changes. Schmorl's node and mild wedging of the L3 vertebral body. Central disc 

protrusion at L5-S1.





Impression: 





1.  Large left periumbilical fat-containing ventral hernia with broad neck.


2.  Hepatic steatosis.








------


Exposure: One or more of the following individualized dose reduction techniques 

were utilized for this examination:  


1. Automated exposure control


2. Adjustment of the mA and/or kV according to patient size


3. Use of iterative reconstruction technique.





Electronically signed by: Caden Archer MD (2022 5:07 PM) [id number]














DICTATED AND SIGNED BY:     CADEN ARCHER MD


DATE:     22





CC: HOWARD GARZA MD; MARCIA ESPINOSA MD ~


[]





Heart Score


C/O Chest Pain:  No


Risk Factors:


Risk Factors:  DM, Current or recent (<one month) smoker, HTN, HLP, family 

history of CAD, obesity.


Risk Scores:


Risk Factors:  DM, Current or recent (<one month) smoker, HTN, HLP, family 

history of CAD, obesity.





Course & Med Decision Making


Course & Med Decision Making


Pertinent Labs and Imaging studies reviewed. (See chart for details)





CT imaging performed shows evidence of ventral hernia but does not contain 

bowel.  Lab work reviewed and shows no convincing evidence for acute infection 

or strangulation.  Patient's condition is stable at this time.  Will refer 

patient to general surgery for outpatient evaluation of her hernia.  Provided 

with prescription for Zofran and hydrocodone for treatment of pain symptoms at 

home and recommend that she follow-up with general surgery at next available 

appointment in the next 1 to 2 weeks.  Recommend return to the emergency 

department for any worsening symptoms.  Patient voiced understanding and in 

agreement with treatment plan.  []





Dragon Disclaimer


Dragon Disclaimer


This electronic medical record was generated, in whole or in part, using a voice

 recognition dictation system.





Departure


Departure:


Impression:  


   Primary Impression:  


   Ventral hernia


Disposition:   HOME / SELF CARE / HOMELESS


Condition:  STABLE


Referrals:  


MARCIA ESPINOSA MD (PCP)








CRISTOFER CARRANZA MD


Patient Instructions:  Hernia





Additional Instructions:  


Contact the office of Dr. Carranza of general surgery to set up an appointment in 

the next 1 to 2 weeks for outpatient consultation regarding your hernia.  Return

 to the emergency department for any worsening symptoms.


Scripts


Ondansetron (ONDANSETRON ODT) 4 Mg Tab.rapdis


1 TAB PO Q8HRS PRN for NAUSEA/VOMITING, #16 TAB


   Prov: HOWARD GARZA MD         22 


Hydrocodone/Acetaminophen (Hydrocodone-Acetamin 5-325 mg) 1 Each Tablet


1 EACH PO Q4-6HRS PRN for PAIN, #18 TAB


   Prov: HOWARD GARZA MD         22





Problem Qualifiers








   Primary Impression:  


   Ventral hernia


   Obstruction and gangrene presence:  without obstruction or gangrene  

   Qualified Codes:  K43.9 - Ventral hernia without obstruction or gangrene








HOWARD GARZA MD               May 29, 2022 16:31

## 2022-05-29 NOTE — RAD
CT ABDOMEN+PELVIS WO



History: Periumbilical abdominal pain, history of ventral hernia.



Comparison: None.



Technique: Noncontrast CT of the abdomen and pelvis. 



Findings:

The lung bases are clear. The heart is unremarkable.



Diffuse hypoattenuation the liver compatible with steatosis. The gallbladder is relatively decompress
ed. The pancreas, spleen, adrenal glands, and kidneys are unremarkable.



The stomach, small bowel, appendix and colon are within normal limits.



The bladder, uterus and adnexa are unremarkable. No intra-abdominal free air or free fluid. The unenh
anced vasculature is unremarkable.



There is a left periumbilical ventral hernia containing peritoneal fat with broad neck measuring 3.7 
x 3.0 cm. The hernia sac measures approximately 7.6 x 6.6 x 4.4 cm. Tiny adjacent fat-containing umbi
lical hernia. Linear scarring in the infraumbilical midline abdominal wall likely relates to postsurg
ical changes. Schmorl's node and mild wedging of the L3 vertebral body. Central disc protrusion at L5
-S1.



Impression: 



1.  Large left periumbilical fat-containing ventral hernia with broad neck.

2.  Hepatic steatosis.





------

Exposure: One or more of the following individualized dose reduction techniques were utilized for thi
s examination:  

1. Automated exposure control

2. Adjustment of the mA and/or kV according to patient size

3. Use of iterative reconstruction technique.



Electronically signed by: Caden Tony MD (5/29/2022 5:07 PM) [id number]